# Patient Record
Sex: FEMALE | Race: WHITE | NOT HISPANIC OR LATINO
[De-identification: names, ages, dates, MRNs, and addresses within clinical notes are randomized per-mention and may not be internally consistent; named-entity substitution may affect disease eponyms.]

---

## 2021-01-01 ENCOUNTER — APPOINTMENT (OUTPATIENT)
Dept: GERIATRICS | Facility: CLINIC | Age: 86
End: 2021-01-01
Payer: MEDICARE

## 2021-01-01 ENCOUNTER — APPOINTMENT (OUTPATIENT)
Dept: CARDIOLOGY | Facility: CLINIC | Age: 86
End: 2021-01-01
Payer: MEDICARE

## 2021-01-01 ENCOUNTER — NON-APPOINTMENT (OUTPATIENT)
Age: 86
End: 2021-01-01

## 2021-01-01 ENCOUNTER — LABORATORY RESULT (OUTPATIENT)
Age: 86
End: 2021-01-01

## 2021-01-01 ENCOUNTER — RESULT REVIEW (OUTPATIENT)
Age: 86
End: 2021-01-01

## 2021-01-01 VITALS — SYSTOLIC BLOOD PRESSURE: 122 MMHG | DIASTOLIC BLOOD PRESSURE: 65 MMHG

## 2021-01-01 VITALS
DIASTOLIC BLOOD PRESSURE: 20 MMHG | RESPIRATION RATE: 18 BRPM | WEIGHT: 127.3 LBS | OXYGEN SATURATION: 99 % | SYSTOLIC BLOOD PRESSURE: 128 MMHG | BODY MASS INDEX: 21.18 KG/M2 | HEART RATE: 72 BPM | TEMPERATURE: 98.1 F

## 2021-01-01 VITALS
BODY MASS INDEX: 21.16 KG/M2 | SYSTOLIC BLOOD PRESSURE: 120 MMHG | HEIGHT: 65 IN | WEIGHT: 127 LBS | OXYGEN SATURATION: 97 % | HEART RATE: 77 BPM | DIASTOLIC BLOOD PRESSURE: 70 MMHG

## 2021-01-01 VITALS
WEIGHT: 145 LBS | DIASTOLIC BLOOD PRESSURE: 86 MMHG | RESPIRATION RATE: 12 BRPM | HEART RATE: 76 BPM | OXYGEN SATURATION: 99 % | SYSTOLIC BLOOD PRESSURE: 144 MMHG | BODY MASS INDEX: 24.13 KG/M2

## 2021-01-01 VITALS
WEIGHT: 147 LBS | DIASTOLIC BLOOD PRESSURE: 80 MMHG | HEART RATE: 96 BPM | BODY MASS INDEX: 24.49 KG/M2 | SYSTOLIC BLOOD PRESSURE: 120 MMHG | OXYGEN SATURATION: 98 % | HEIGHT: 65 IN | TEMPERATURE: 98 F

## 2021-01-01 DIAGNOSIS — M25.552 PAIN IN LEFT HIP: ICD-10-CM

## 2021-01-01 DIAGNOSIS — R01.1 CARDIAC MURMUR, UNSPECIFIED: ICD-10-CM

## 2021-01-01 DIAGNOSIS — Z87.891 PERSONAL HISTORY OF NICOTINE DEPENDENCE: ICD-10-CM

## 2021-01-01 DIAGNOSIS — E78.00 PURE HYPERCHOLESTEROLEMIA, UNSPECIFIED: ICD-10-CM

## 2021-01-01 DIAGNOSIS — Z01.810 ENCOUNTER FOR PREPROCEDURAL CARDIOVASCULAR EXAMINATION: ICD-10-CM

## 2021-01-01 DIAGNOSIS — N32.81 OVERACTIVE BLADDER: ICD-10-CM

## 2021-01-01 DIAGNOSIS — R35.0 FREQUENCY OF MICTURITION: ICD-10-CM

## 2021-01-01 DIAGNOSIS — R26.9 UNSPECIFIED ABNORMALITIES OF GAIT AND MOBILITY: ICD-10-CM

## 2021-01-01 DIAGNOSIS — N39.0 URINARY TRACT INFECTION, SITE NOT SPECIFIED: ICD-10-CM

## 2021-01-01 DIAGNOSIS — G47.00 INSOMNIA, UNSPECIFIED: ICD-10-CM

## 2021-01-01 DIAGNOSIS — M16.12 UNILATERAL PRIMARY OSTEOARTHRITIS, LEFT HIP: ICD-10-CM

## 2021-01-01 DIAGNOSIS — R41.3 OTHER AMNESIA: ICD-10-CM

## 2021-01-01 DIAGNOSIS — H61.23 IMPACTED CERUMEN, BILATERAL: ICD-10-CM

## 2021-01-01 LAB
APPEARANCE: ABNORMAL
BILIRUBIN URINE: NEGATIVE
BLOOD URINE: ABNORMAL
COLOR: NORMAL
GLUCOSE QUALITATIVE U: NEGATIVE
KETONES URINE: NEGATIVE
LEUKOCYTE ESTERASE URINE: ABNORMAL
NITRITE URINE: NEGATIVE
PH URINE: 6
PROTEIN URINE: NEGATIVE
SPECIFIC GRAVITY URINE: 1.01
UROBILINOGEN URINE: NORMAL

## 2021-01-01 PROCEDURE — 99213 OFFICE O/P EST LOW 20 MIN: CPT

## 2021-01-01 PROCEDURE — 99214 OFFICE O/P EST MOD 30 MIN: CPT

## 2021-01-01 PROCEDURE — 93000 ELECTROCARDIOGRAM COMPLETE: CPT | Mod: NC

## 2021-01-01 PROCEDURE — 99204 OFFICE O/P NEW MOD 45 MIN: CPT

## 2021-01-01 PROCEDURE — 93306 TTE W/DOPPLER COMPLETE: CPT

## 2021-01-01 RX ORDER — CIPROFLOXACIN HYDROCHLORIDE 250 MG/1
250 TABLET, FILM COATED ORAL
Qty: 14 | Refills: 0 | Status: DISCONTINUED | COMMUNITY
Start: 2021-03-14 | End: 2021-01-01

## 2021-01-01 RX ORDER — MULTIVIT-MIN/IRON/FOLIC ACID/K 18-600-40
CAPSULE ORAL
Refills: 0 | Status: ACTIVE | COMMUNITY

## 2021-01-01 RX ORDER — AMLODIPINE BESYLATE 5 MG/1
5 TABLET ORAL DAILY
Qty: 90 | Refills: 3 | Status: ACTIVE | COMMUNITY
Start: 2021-03-09 | End: 1900-01-01

## 2021-01-01 RX ORDER — TRAZODONE HYDROCHLORIDE 100 MG/1
100 TABLET ORAL
Qty: 30 | Refills: 5 | Status: ACTIVE | COMMUNITY
Start: 2021-01-01 | End: 1900-01-01

## 2021-01-01 RX ORDER — B-COMPLEX WITH VITAMIN C
TABLET ORAL
Refills: 0 | Status: ACTIVE | COMMUNITY

## 2021-01-01 RX ORDER — TRAZODONE HYDROCHLORIDE 50 MG/1
50 TABLET ORAL
Qty: 45 | Refills: 10 | Status: DISCONTINUED | COMMUNITY
Start: 2021-03-09 | End: 2021-01-01

## 2021-03-03 PROBLEM — Z00.00 ENCOUNTER FOR PREVENTIVE HEALTH EXAMINATION: Status: ACTIVE | Noted: 2021-03-03

## 2021-03-09 ENCOUNTER — RESULT REVIEW (OUTPATIENT)
Age: 86
End: 2021-03-09

## 2021-03-09 ENCOUNTER — APPOINTMENT (OUTPATIENT)
Dept: GERIATRICS | Facility: CLINIC | Age: 86
End: 2021-03-09
Payer: MEDICARE

## 2021-03-09 VITALS — DIASTOLIC BLOOD PRESSURE: 80 MMHG | SYSTOLIC BLOOD PRESSURE: 120 MMHG

## 2021-03-09 VITALS
RESPIRATION RATE: 16 BRPM | TEMPERATURE: 98.5 F | BODY MASS INDEX: 21.99 KG/M2 | HEART RATE: 79 BPM | HEIGHT: 65 IN | SYSTOLIC BLOOD PRESSURE: 102 MMHG | OXYGEN SATURATION: 97 % | DIASTOLIC BLOOD PRESSURE: 80 MMHG | WEIGHT: 132 LBS

## 2021-03-09 DIAGNOSIS — R42 DIZZINESS AND GIDDINESS: ICD-10-CM

## 2021-03-09 DIAGNOSIS — B35.3 TINEA PEDIS: ICD-10-CM

## 2021-03-09 DIAGNOSIS — Z85.3 PERSONAL HISTORY OF MALIGNANT NEOPLASM OF BREAST: ICD-10-CM

## 2021-03-09 DIAGNOSIS — R79.89 OTHER SPECIFIED ABNORMAL FINDINGS OF BLOOD CHEMISTRY: ICD-10-CM

## 2021-03-09 PROCEDURE — 93010 ELECTROCARDIOGRAM REPORT: CPT

## 2021-03-09 PROCEDURE — 99205 OFFICE O/P NEW HI 60 MIN: CPT | Mod: 25

## 2021-03-09 RX ORDER — OMEPRAZOLE 20 MG/1
20 CAPSULE, DELAYED RELEASE ORAL
Qty: 180 | Refills: 3 | Status: ACTIVE | COMMUNITY
Start: 2021-03-09 | End: 1900-01-01

## 2021-03-09 RX ORDER — NYSTATIN 100000 1/G
100000 POWDER TOPICAL
Qty: 1 | Refills: 5 | Status: ACTIVE | COMMUNITY
Start: 2021-03-09 | End: 1900-01-01

## 2021-03-09 NOTE — HISTORY OF PRESENT ILLNESS
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

## 2021-03-09 NOTE — REVIEW OF SYSTEMS
[Incontinence] : incontinence [Arthralgias] : arthralgias [Confused] : confusion [Dizziness] : dizziness [Limb Weakness] : limb weakness [Difficulty Walking] : difficulty walking [Anxiety] : anxiety [Negative] : Heme/Lymph [FreeTextEntry2] : poor sleep [FreeTextEntry9] : left hip arthritis -

## 2021-03-09 NOTE — PHYSICAL EXAM
[General Appearance - Alert] : alert [General Appearance - In No Acute Distress] : in no acute distress [General Appearance - Well Nourished] : well nourished [General Appearance - Well Developed] : well developed [General Appearance - Well-Appearing] : healthy appearing [Sclera] : the sclera and conjunctiva were normal [PERRL With Normal Accommodation] : pupils were equal in size, round, and reactive to light [Extraocular Movements] : extraocular movements were intact [Strabismus] : no strabismus was seen [Outer Ear] : the ears and nose were normal in appearance [Neck Appearance] : the appearance of the neck was normal [Neck Cervical Mass (___cm)] : no neck mass was observed [Jugular Venous Distention Increased] : there was no jugular-venous distention [Thyroid Diffuse Enlargement] : the thyroid was not enlarged [Thyroid Nodule] : there were no palpable thyroid nodules [Auscultation Breath Sounds / Voice Sounds] : lungs were clear to auscultation bilaterally [Heart Rate And Rhythm] : heart rate was normal and rhythm regular [Heart Sounds] : normal S1 and S2 [Heart Sounds Gallop] : no gallops [Murmurs] : no murmurs [Heart Sounds Pericardial Friction Rub] : no pericardial rub [Edema] : there was no peripheral edema [Breast Appearance] : normal in appearance [Breast Palpation Mass] : no palpable masses [Bowel Sounds] : normal bowel sounds [Abdomen Soft] : soft [Abdomen Tenderness] : non-tender [] : no hepato-splenomegaly [Abdomen Mass (___ Cm)] : no abdominal mass palpated [External Female Genitalia] : normal external genitalia [Cervical Lymph Nodes Enlarged Posterior Bilaterally] : posterior cervical [Cervical Lymph Nodes Enlarged Anterior Bilaterally] : anterior cervical [Supraclavicular Lymph Nodes Enlarged Bilaterally] : supraclavicular [Axillary Lymph Nodes Enlarged Bilaterally] : axillary [Femoral Lymph Nodes Enlarged Bilaterally] : femoral [Inguinal Lymph Nodes Enlarged Bilaterally] : inguinal [No CVA Tenderness] : no ~M costovertebral angle tenderness [No Spinal Tenderness] : no spinal tenderness [Involuntary Movements] : no involuntary movements were seen [No Focal Deficits] : no focal deficits [FreeTextEntry1] : cognitive loss, anxious

## 2021-03-09 NOTE — ASSESSMENT
[FreeTextEntry1] : tinea feet - wash and dry use nystop\par podiatry needs to cut nails\par \par is now on oxybutinin for incontinence - check if helping\par discuss urogyn consult\par \par BP good\par \par checking labs\par \par debrox for cerumen impaction\par \par trial of 25 mg trazodone to help sleep\par \par memory loss - check labs - consider aricept\par try to get MMS\par denies depression

## 2021-04-27 ENCOUNTER — RESULT REVIEW (OUTPATIENT)
Age: 86
End: 2021-04-27

## 2021-04-27 ENCOUNTER — APPOINTMENT (OUTPATIENT)
Dept: GERIATRICS | Facility: CLINIC | Age: 86
End: 2021-04-27
Payer: MEDICARE

## 2021-04-27 VITALS
BODY MASS INDEX: 21.5 KG/M2 | WEIGHT: 129.2 LBS | DIASTOLIC BLOOD PRESSURE: 70 MMHG | OXYGEN SATURATION: 97 % | HEART RATE: 94 BPM | SYSTOLIC BLOOD PRESSURE: 135 MMHG

## 2021-04-27 PROCEDURE — 99213 OFFICE O/P EST LOW 20 MIN: CPT

## 2021-04-27 NOTE — HISTORY OF PRESENT ILLNESS
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\par \par left hip pain - left hip arthritis\par \par unable to give history\par there is clear cognitive loss - but pt is very defensive\par she states she is happy and has no problems\par hypertension, high cholesterol, hypothyroid, low vit D\par \par \par dizziness - when awakens\par and sits up\par uses rollator - no falls\par has chair lift\par wearing smoothe bottom sandels\par \par nocturia - several times in the night\par urinary urgency\par accidents\par one year ago had UTI and was hospitalized\par \par \par onc Dr Seema Archibald - has acted as PCP\par \par 4/27/21\par pt f/up\par pain in left hip/groin\par ?use of debrox\par trazodone 25 plus mellation 5 mg qhs\par still not sleeping\par  [de-identified] : PT has aid

## 2021-04-27 NOTE — ASSESSMENT
[FreeTextEntry1] : repeat urine - check if UTI cleared\par \par still has insomnia increase trazodone to 50 mg qhs\par \par hard cerumen rt - needs to do debrox\par \par xray left hip/pelvis

## 2021-04-27 NOTE — REVIEW OF SYSTEMS
[Loss Of Hearing] : hearing loss [Confused] : confusion [Negative] : Heme/Lymph [FreeTextEntry1] : klaudia guthrie

## 2021-04-27 NOTE — PHYSICAL EXAM
[General Appearance - Alert] : alert [General Appearance - In No Acute Distress] : in no acute distress [General Appearance - Well Nourished] : well nourished [General Appearance - Well Developed] : well developed [General Appearance - Well-Appearing] : healthy appearing [Sclera] : the sclera and conjunctiva were normal [PERRL With Normal Accommodation] : pupils were equal in size, round, and reactive to light [Extraocular Movements] : extraocular movements were intact [Strabismus] : no strabismus was seen [Outer Ear] : the ears and nose were normal in appearance [] : no respiratory distress [Auscultation Breath Sounds / Voice Sounds] : lungs were clear to auscultation bilaterally [Involuntary Movements] : no involuntary movements were seen [No Focal Deficits] : no focal deficits [FreeTextEntry1] : cognitive loss, anxious

## 2021-05-11 PROBLEM — M25.552 LEFT HIP PAIN: Status: ACTIVE | Noted: 2021-04-27

## 2021-05-11 PROBLEM — H61.23 BILATERAL IMPACTED CERUMEN: Status: ACTIVE | Noted: 2021-03-09

## 2021-05-11 PROBLEM — G47.00 INSOMNIA: Status: ACTIVE | Noted: 2021-03-09

## 2021-05-11 NOTE — REVIEW OF SYSTEMS
[Loss Of Hearing] : hearing loss [Incontinence] : incontinence [Arthralgias] : arthralgias [Negative] : Integumentary [FreeTextEntry9] : left hip

## 2021-05-11 NOTE — ASSESSMENT
[FreeTextEntry1] : repeat urine - check if UTI cleared\par \par still has insomnia increase trazodone to 50 mg qhs\par \par hard cerumen rt - needs to do debrox\par \par xray left hip/pelvis\par \par 5/11/21\par \par cerumen removed easily after debrox used!\par \par severe OA left hip = declines intervention\par \par still not sleeping\par trial of 100 mg trazodone\par \par \par going to podiatry\par \par going to get  consult from urogyn- frequent utis

## 2021-05-11 NOTE — PHYSICAL EXAM
[General Appearance - Alert] : alert [General Appearance - In No Acute Distress] : in no acute distress [General Appearance - Well Nourished] : well nourished [General Appearance - Well Developed] : well developed [General Appearance - Well-Appearing] : healthy appearing [Sclera] : the sclera and conjunctiva were normal [PERRL With Normal Accommodation] : pupils were equal in size, round, and reactive to light [Extraocular Movements] : extraocular movements were intact [Strabismus] : no strabismus was seen [Outer Ear] : the ears and nose were normal in appearance [Neck Appearance] : the appearance of the neck was normal [Neck Cervical Mass (___cm)] : no neck mass was observed [Jugular Venous Distention Increased] : there was no jugular-venous distention [Thyroid Diffuse Enlargement] : the thyroid was not enlarged [Thyroid Nodule] : there were no palpable thyroid nodules [] : no respiratory distress [Auscultation Breath Sounds / Voice Sounds] : lungs were clear to auscultation bilaterally [Heart Rate And Rhythm] : heart rate was normal and rhythm regular [Heart Sounds] : normal S1 and S2 [Heart Sounds Gallop] : no gallops [Murmurs] : no murmurs [Heart Sounds Pericardial Friction Rub] : no pericardial rub [Edema] : there was no peripheral edema [External Female Genitalia] : normal external genitalia [Cervical Lymph Nodes Enlarged Posterior Bilaterally] : posterior cervical [Cervical Lymph Nodes Enlarged Anterior Bilaterally] : anterior cervical [Supraclavicular Lymph Nodes Enlarged Bilaterally] : supraclavicular [Axillary Lymph Nodes Enlarged Bilaterally] : axillary [Femoral Lymph Nodes Enlarged Bilaterally] : femoral [Inguinal Lymph Nodes Enlarged Bilaterally] : inguinal [No CVA Tenderness] : no ~M costovertebral angle tenderness [No Spinal Tenderness] : no spinal tenderness [Involuntary Movements] : no involuntary movements were seen [No Focal Deficits] : no focal deficits [FreeTextEntry1] : cognitive loss,calmer

## 2021-05-11 NOTE — HISTORY OF PRESENT ILLNESS
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\par \par left hip pain - left hip arthritis\par \par unable to give history\par there is clear cognitive loss - but pt is very defensive\par she states she is happy and has no problems\par hypertension, high cholesterol, hypothyroid, low vit D\par \par \par dizziness - when awakens\par and sits up\par uses rollator - no falls\par has chair lift\par wearing smoothe bottom sandels\par \par nocturia - several times in the night\par urinary urgency\par accidents\par one year ago had UTI and was hospitalized\par \par \par onc Dr Seema Archibald - has acted as PCP\par \par 4/27/21\par pt f/up\par pain in left hip/groin\par ?use of debrox\par trazodone 25 plus mellation 5 mg qhs\par still not sleeping\par \par 5/11/21\par pt s/p rx for uti  \par xray left hip severe OA\par does not want any surgery- denies pain\par takes tylenol\par \par got j and j covid vacciine 5/7/21\par \par

## 2021-08-24 PROBLEM — R41.3 MEMORY IMPAIRMENT: Status: ACTIVE | Noted: 2021-03-09

## 2021-08-24 PROBLEM — E78.00 HIGH BLOOD CHOLESTEROL: Status: ACTIVE | Noted: 2021-03-09

## 2021-08-24 PROBLEM — R26.9 GAIT DISORDER: Status: ACTIVE | Noted: 2021-03-09

## 2021-08-24 PROBLEM — N39.0 ACUTE LOWER UTI: Status: ACTIVE | Noted: 2021-03-14

## 2021-08-24 PROBLEM — R35.0 URINARY FREQUENCY: Status: ACTIVE | Noted: 2021-03-09

## 2021-08-24 NOTE — HISTORY OF PRESENT ILLNESS
[FreeTextEntry1] : pt saw Dr Alford - has OAB\par not bothering her\par another uti\par now on bactrim long term\par helping during the day\par some nocturia\par \par left hip pain - rubbing leg\par had xray advanced djd left hip\par \par one son\par two grandsons\par two great grand daughters few months old\par

## 2021-08-24 NOTE — PHYSICAL EXAM
[General Appearance - Alert] : alert [General Appearance - In No Acute Distress] : in no acute distress [General Appearance - Well Nourished] : well nourished [General Appearance - Well Developed] : well developed [General Appearance - Well-Appearing] : healthy appearing [PERRL With Normal Accommodation] : pupils were equal in size, round, and reactive to light [Extraocular Movements] : extraocular movements were intact [Strabismus] : no strabismus was seen [Outer Ear] : the ears and nose were normal in appearance [Involuntary Movements] : no involuntary movements were seen [Alert] : alert [Well Nourished] : well nourished [Healthy Appearing] : healthy appearing [Well Developed] : well developed [Normal Voice/Communication] : normal voice/communication [Sclera] : the sclera and conjunctiva were normal [PERRL] : pupils were equal in size, round, and reactive to light [Normal Oral Mucosa] : normal oral mucosa [No Oral Pallor] : no oral pallor [Normal Outer Ear/Nose] : the ears and nose were normal in appearance [Normal Appearance] : the appearance of the neck was normal [No Neck Mass] : no neck mass was observed [Supple] : the neck was supple [No Acc Muscle Use] : no accessory muscle use [Respiration, Rhythm And Depth] : normal respiratory rhythm and effort [Auscultation Breath Sounds / Voice Sounds] : lungs were clear to auscultation bilaterally [Normal S1, S2] : normal S1 and S2 [Heart Rate And Rhythm] : heart rate was normal and rhythm regular [Edema] : edema was not present [Abdomen Tenderness] : non-tender [Abdomen Soft] : soft [] : no hepato-splenomegaly [Normal] : no spinal tenderness [No Focal Deficits] : no focal deficits [Normal Affect] : the affect was normal [Remote Memory Normal] : remote memory was not impaired [de-identified] : pt is having a lot of pain from left hip - limpin [de-identified] : antalgic gait [de-identified] : skin tear - left calf - cleaned with sterile water - bactracin - non stick bandage [de-identified] : poor stm [FreeTextEntry1] : cognitive loss, anxious

## 2021-08-24 NOTE — ASSESSMENT
[FreeTextEntry1] : repeat urine - check if UTI cleared\par \par still has insomnia increase trazodone to 50 mg qhs\par \par hard cerumen rt - needs to do debrox\par \par xray left hip/pelvis\par \par \par 8/24/21\par pt referred to dr korey vaughn re severe oa left hip\par wound bandaged\par saw urogyn

## 2021-12-08 PROBLEM — Z87.891 FORMER SMOKER: Status: ACTIVE | Noted: 2021-01-01

## 2021-12-08 PROBLEM — Z01.810 PREOPERATIVE CARDIOVASCULAR EXAMINATION: Status: ACTIVE | Noted: 2021-01-01

## 2021-12-08 NOTE — HISTORY OF PRESENT ILLNESS
[FreeTextEntry1] : 89 yo female with hypertension, hyperlipidemia, who presents for pre-operative cardiac evaluation for pending left hip replacement surgery on 1/3/2022 with Dr. Norman Kennedy at [facility]. Patient denies chest pain, dyspnea, palpitations, syncope, edema, melena, hematochezia, or hematemesis. She only reports left hip pain.\par \par Primary: Nori De La Torre

## 2021-12-08 NOTE — PHYSICAL EXAM
[Well Developed] : well developed [No Acute Distress] : no acute distress [Normal Conjunctiva] : normal conjunctiva [Normal Venous Pressure] : normal venous pressure [No Carotid Bruit] : no carotid bruit [Normal Rate] : normal [Rhythm Regular] : regular [Normal S1] : normal S1 [Normal S2] : normal S2 [II] : a grade 2 [___+] : [unfilled]U+ pretibial pitting edema on the left [2+] : left 2+ [Clear Lung Fields] : clear lung fields [Good Air Entry] : good air entry [No Respiratory Distress] : no respiratory distress  [No Cyanosis] : no cyanosis [No Clubbing] : no clubbing [S3] : no S3 [S4] : no S4 [Right Carotid Bruit] : no bruit heard over the right carotid [Left Carotid Bruit] : no bruit heard over the left carotid [de-identified] : Wound dressing present on left leg

## 2021-12-08 NOTE — ASSESSMENT
[FreeTextEntry1] : 89 yo female with hypertension, hyperlipidemia, and cardiac murmur, who is currently pending left hip replacement surgery on 1/3/2022 with Dr. Norman Kennedy at [place] [facility].\par ECG today shows sinus rhythm with poor R wave progression.\par \par Will perform echocardiogram to assess LV function and structural heart disease given cardiac murmur.\par WIll perform Lexiscan nuclear stress test for ischemic evaluation/pre-operative risk stratification.\par Pending test results, will determine if further cardiac work-up or intervention is clinically indicated and make final pre-operative cardiac risk assessment.\par \par BP is currently adequately controlled. Will continue current management of losartan 100 mg po daily and amlodipine 5 mg po daily.\par \par  per 3/2021 labs. Will continue simvastatin 20 mg po daily pending results of above cardiac evaluation.

## 2021-12-21 PROBLEM — M16.12 OSTEOARTHRITIS OF LEFT HIP: Status: ACTIVE | Noted: 2021-01-01

## 2021-12-21 PROBLEM — N32.81 OAB (OVERACTIVE BLADDER): Status: ACTIVE | Noted: 2021-01-01

## 2021-12-21 NOTE — REVIEW OF SYSTEMS
[Incontinence] : incontinence [Confused] : confusion [Limb Weakness] : limb weakness [Difficulty Walking] : difficulty walking [Negative] : Eyes [Chest Pain] : no chest pain [Shortness Of Breath] : no shortness of breath [SOB on Exertion] : no shortness of breath during exertion [FreeTextEntry8] : left hip pain

## 2021-12-21 NOTE — HISTORY OF PRESENT ILLNESS
[FreeTextEntry1] : pt saw Dr Alford - has OAB\par not bothering her\par another uti\par now on bactrim long term\par helping during the day\par some nocturia\par \par left hip pain - rubbing leg\par had xray advanced djd left hip\par \par one son\par two grandsons\par two great grand daughters few months old\par \par \par 12/21/21\par pt is a 89 year old woman severe OA left hip\par to have replacement with Dr Kennedy on 1/3/22\par she has seen Dr Sinha and had stress test, echo (pending) and all pre op labs and cxr\par all wnl\par echo pending tomorrow\par \par pt is in a lot of pain\par \par covid booster pfizer on nov 18\par first shot was j and j\par \par pt has been been well\par hip hurts when tries to walk\par \par not sleeping well\par confusion/pain\par overactive bladder seen by urology\par on bactrim ss prophylactsis

## 2021-12-21 NOTE — PHYSICAL EXAM
[Alert] : alert [Well Nourished] : well nourished [Healthy Appearing] : healthy appearing [Well Developed] : well developed [Normal Voice/Communication] : normal voice/communication [PERRL] : pupils were equal in size, round, and reactive to light [Normal Oral Mucosa] : normal oral mucosa [No Oral Pallor] : no oral pallor [Normal Outer Ear/Nose] : the ears and nose were normal in appearance [Normal Appearance] : the appearance of the neck was normal [No Neck Mass] : no neck mass was observed [Supple] : the neck was supple [No Acc Muscle Use] : no accessory muscle use [Respiration, Rhythm And Depth] : normal respiratory rhythm and effort [Normal S1, S2] : normal S1 and S2 [Heart Rate And Rhythm] : heart rate was normal and rhythm regular [Edema] : edema was not present [Abdomen Tenderness] : non-tender [Abdomen Soft] : soft [Normal] : no spinal tenderness [Normal Affect] : the affect was normal [Remote Memory Normal] : remote memory was not impaired [General Appearance - Alert] : alert [General Appearance - In No Acute Distress] : in no acute distress [General Appearance - Well Nourished] : well nourished [General Appearance - Well Developed] : well developed [General Appearance - Well-Appearing] : healthy appearing [Sclera] : the sclera and conjunctiva were normal [PERRL With Normal Accommodation] : pupils were equal in size, round, and reactive to light [Extraocular Movements] : extraocular movements were intact [Strabismus] : no strabismus was seen [Outer Ear] : the ears and nose were normal in appearance [] : no respiratory distress [Auscultation Breath Sounds / Voice Sounds] : lungs were clear to auscultation bilaterally [Involuntary Movements] : no involuntary movements were seen [No Focal Deficits] : no focal deficits [de-identified] : pt is having a lot of pain from left hip - limping [de-identified] : 2/6 systolic murmur, LE edema lt>rt - wound is healed left calf - is covered with bandage -  [de-identified] : antalgic gait [de-identified] : poor stm [FreeTextEntry1] : cognitive loss, anxious

## 2021-12-21 NOTE — ASSESSMENT
[FreeTextEntry1] : repeat urine - check if UTI cleared\par \par still has insomnia increase trazodone to 50 mg qhs\par \par hard cerumen rt - needs to do debrox\par \par xray left hip/pelvis\par \par \par 8/24/21\par pt referred to dr korey vaughn re severe oa left hip\par wound bandaged\par saw urogyn\par \par 12/21/21\par \par pt has normal labs\par checking urine\par cardiologist did stress test ok\par echo is tomorrow\par \par medically no contraindication to surgery\par pending cardioloigst   finishing work up\par \par

## 2021-12-22 PROBLEM — R01.1 CARDIAC MURMUR: Status: ACTIVE | Noted: 2021-01-01

## 2022-01-01 ENCOUNTER — RX RENEWAL (OUTPATIENT)
Age: 87
End: 2022-01-01

## 2022-01-01 ENCOUNTER — NON-APPOINTMENT (OUTPATIENT)
Age: 87
End: 2022-01-01

## 2022-01-01 ENCOUNTER — APPOINTMENT (OUTPATIENT)
Dept: GERIATRICS | Facility: CLINIC | Age: 87
End: 2022-01-01
Payer: MEDICARE

## 2022-01-01 DIAGNOSIS — B37.0 CANDIDAL STOMATITIS: ICD-10-CM

## 2022-01-01 DIAGNOSIS — I10 ESSENTIAL (PRIMARY) HYPERTENSION: ICD-10-CM

## 2022-01-01 DIAGNOSIS — F03.91 UNSPECIFIED DEMENTIA WITH BEHAVIORAL DISTURBANCE: ICD-10-CM

## 2022-01-01 DIAGNOSIS — R53.1 WEAKNESS: ICD-10-CM

## 2022-01-01 DIAGNOSIS — E03.9 HYPOTHYROIDISM, UNSPECIFIED: ICD-10-CM

## 2022-01-01 PROCEDURE — 99214 OFFICE O/P EST MOD 30 MIN: CPT | Mod: 95

## 2022-01-01 RX ORDER — SULFAMETHOXAZOLE AND TRIMETHOPRIM 400; 80 MG/1; MG/1
400-80 TABLET ORAL DAILY
Refills: 0 | Status: DISCONTINUED | COMMUNITY
Start: 2021-01-01 | End: 2022-01-01

## 2022-01-01 RX ORDER — SIMVASTATIN 20 MG/1
20 TABLET, FILM COATED ORAL
Qty: 90 | Refills: 3 | Status: ACTIVE | COMMUNITY
Start: 2021-03-09 | End: 1900-01-01

## 2022-01-01 RX ORDER — LEVOTHYROXINE SODIUM 0.07 MG/1
75 TABLET ORAL DAILY
Qty: 90 | Refills: 3 | Status: ACTIVE | COMMUNITY
Start: 2021-03-09 | End: 1900-01-01

## 2022-01-01 RX ORDER — OXYBUTYNIN CHLORIDE 5 MG/1
5 TABLET ORAL
Qty: 90 | Refills: 3 | Status: ACTIVE | COMMUNITY
Start: 2021-03-09 | End: 1900-01-01

## 2022-01-01 RX ORDER — ERGOCALCIFEROL 1.25 MG/1
1.25 MG CAPSULE, LIQUID FILLED ORAL
Qty: 8 | Refills: 0 | Status: ACTIVE | COMMUNITY
Start: 2021-03-09 | End: 1900-01-01

## 2022-01-01 RX ORDER — NYSTATIN 100000 [USP'U]/ML
100000 SUSPENSION ORAL 3 TIMES DAILY
Qty: 150 | Refills: 3 | Status: ACTIVE | COMMUNITY
Start: 2022-01-01 | End: 1900-01-01

## 2022-01-01 RX ORDER — LOSARTAN POTASSIUM 100 MG/1
100 TABLET, FILM COATED ORAL
Qty: 90 | Refills: 3 | Status: ACTIVE | COMMUNITY
Start: 2021-03-09 | End: 1900-01-01

## 2022-04-15 PROBLEM — B37.0 ORAL THRUSH: Status: ACTIVE | Noted: 2022-01-01

## 2022-05-06 PROBLEM — R53.1 WEAKNESS: Status: ACTIVE | Noted: 2022-01-01

## 2022-05-06 PROBLEM — I10 ESSENTIAL HYPERTENSION: Status: ACTIVE | Noted: 2021-01-01

## 2022-05-06 PROBLEM — F03.91 AGITATION DUE TO DEMENTIA: Status: ACTIVE | Noted: 2022-01-01

## 2022-05-06 PROBLEM — E03.9 ACQUIRED HYPOTHYROIDISM: Status: ACTIVE | Noted: 2021-03-09

## 2022-05-06 NOTE — ASSESSMENT
[FreeTextEntry1] : repeat urine - check if UTI cleared\par \par still has insomnia increase trazodone to 50 mg qhs\par \par hard cerumen rt - needs to do debrox\par \par xray left hip/pelvis\par \par \par 8/24/21\par pt referred to dr korey vaughn re severe oa left hip\par wound bandaged\par saw urogyn\par \par 12/21/21\par \par pt has normal labs\par checking urine\par cardiologist did stress test ok\par echo is tomorrow\par \par medically no contraindication to surgery\par pending cardioloigst   finishing work up\par \par 5/6/22\par pt appears very weak -\par unable to take PO\par bp is low\par spoke to queenie care manager\par then group discussion with only son who is in [place] and is proxy\par we agreed pt to go to [facility] for eval r/o infection/ source of pain??\par if no reasonable quality of life can be returned\par I suggested hospice

## 2022-05-06 NOTE — HISTORY OF PRESENT ILLNESS
[FreeTextEntry1] : pt saw Dr Alford - has OAB\par not bothering her\par another uti\par now on bactrim long term\par helping during the day\par some nocturia\par \par left hip pain - rubbing leg\par had xray advanced djd left hip\par \par one son\par two grandsons\par two great grand daughters few months old\par \par \par 12/21/21\par pt is a 89 year old woman severe OA left hip\par to have replacement with Dr Kennedy on 1/3/22\par she has seen Dr Sinha and had stress test, echo (pending) and all pre op labs and cxr\par all wnl\par echo pending tomorrow\par \par pt is in a lot of pain\par \par covid booster pfizer on nov 18\par first shot was j and j\par \par \par \par \par \par \par pt has been been well\par hip hurts when tries to walk\par \par not sleeping well\par confusion/pain\par overactive bladder seen by urology\par on bactrim ss prophylaxis\par \par 5/6/22\par \par video meeting\par very weak and sleeping\par screamed when  tried to wake her\par says no to pain\par had yogurt and ensure yesterday\par had hip replacement 1/2022\par back from [place] 3 weeks ago\par got pneumonia there\par now on 02\par twice back in hospital\par pneumonia\par uteral stent\par covid twice\par also had ileus\par \par home for 3 weeks\par had thrush - treated with  nystatin\par unable to eat\par spit out all her meds today\par very lethargic\par hr 61\par bp 93/61\par po2 95% on RA\par has been using 2 liters nc -\par

## 2022-05-06 NOTE — REVIEW OF SYSTEMS
[Incontinence] : incontinence [Confused] : confusion [Limb Weakness] : limb weakness [Difficulty Walking] : difficulty walking [Negative] : Psychiatric [Feeling Poorly] : feeling poorly [Feeling Tired] : feeling tired [Recent Weight Loss (___ Lbs)] : recent [unfilled] ~Ulb weight loss [Shortness Of Breath] : shortness of breath [Chest Pain] : no chest pain [SOB on Exertion] : no shortness of breath during exertion [FreeTextEntry5] : holding BP meds [FreeTextEntry7] : moves bowel s - normal soft

## 2022-05-13 ENCOUNTER — NON-APPOINTMENT (OUTPATIENT)
Age: 87
End: 2022-05-13

## 2022-05-18 ENCOUNTER — APPOINTMENT (OUTPATIENT)
Dept: GERIATRICS | Facility: HOME HEALTH | Age: 87
End: 2022-05-18

## 2022-12-12 NOTE — PHYSICAL EXAM
[Alert] : alert [No Acc Muscle Use] : no accessory muscle use [Normal S1, S2] : normal S1 and S2 [Heart Rate And Rhythm] : heart rate was normal and rhythm regular [Edema] : edema was not present [Abdomen Tenderness] : non-tender [Abdomen Soft] : soft [Normal] : no spinal tenderness [Normal Affect] : the affect was normal [Remote Memory Normal] : remote memory was not impaired [General Appearance - Alert] : alert [General Appearance - In No Acute Distress] : in no acute distress [General Appearance - Well Nourished] : well nourished [General Appearance - Well Developed] : well developed [General Appearance - Well-Appearing] : healthy appearing [Sclera] : the sclera and conjunctiva were normal [PERRL With Normal Accommodation] : pupils were equal in size, round, and reactive to light [Extraocular Movements] : extraocular movements were intact [Strabismus] : no strabismus was seen [Outer Ear] : the ears and nose were normal in appearance [] : no respiratory distress [Auscultation Breath Sounds / Voice Sounds] : lungs were clear to auscultation bilaterally [Involuntary Movements] : no involuntary movements were seen [No Focal Deficits] : no focal deficits [de-identified] : pt is in bed - little response - occas moaning [de-identified] : no edema - bruises seen on lower legs [de-identified] : antalgic gait [de-identified] : poor stm [FreeTextEntry1] : cognitive loss, anxious DISCHARGE